# Patient Record
Sex: MALE
[De-identification: names, ages, dates, MRNs, and addresses within clinical notes are randomized per-mention and may not be internally consistent; named-entity substitution may affect disease eponyms.]

---

## 2018-08-27 ENCOUNTER — APPOINTMENT (OUTPATIENT)
Dept: NEUROSURGERY | Facility: CLINIC | Age: 48
End: 2018-08-27
Payer: COMMERCIAL

## 2018-08-27 PROBLEM — Z00.00 ENCOUNTER FOR PREVENTIVE HEALTH EXAMINATION: Status: ACTIVE | Noted: 2018-08-27

## 2018-08-27 PROCEDURE — 99244 OFF/OP CNSLTJ NEW/EST MOD 40: CPT

## 2018-10-05 ENCOUNTER — RECORD ABSTRACTING (OUTPATIENT)
Age: 48
End: 2018-10-05

## 2018-10-05 DIAGNOSIS — Z78.9 OTHER SPECIFIED HEALTH STATUS: ICD-10-CM

## 2018-10-05 RX ORDER — MELOXICAM 15 MG/1
15 TABLET ORAL DAILY
Refills: 0 | Status: ACTIVE | COMMUNITY

## 2018-10-10 ENCOUNTER — APPOINTMENT (OUTPATIENT)
Dept: NEUROSURGERY | Facility: CLINIC | Age: 48
End: 2018-10-10

## 2018-12-12 ENCOUNTER — APPOINTMENT (OUTPATIENT)
Dept: NEUROSURGERY | Facility: CLINIC | Age: 48
End: 2018-12-12
Payer: COMMERCIAL

## 2018-12-12 DIAGNOSIS — R51 HEADACHE: ICD-10-CM

## 2018-12-12 DIAGNOSIS — M79.606 PAIN IN LEG, UNSPECIFIED: ICD-10-CM

## 2018-12-12 PROCEDURE — 99214 OFFICE O/P EST MOD 30 MIN: CPT

## 2018-12-18 PROBLEM — R51 OCCIPITAL PAIN: Status: ACTIVE | Noted: 2018-10-05

## 2018-12-18 PROBLEM — M79.606 LEG PAIN: Status: ACTIVE | Noted: 2018-10-05

## 2019-01-30 ENCOUNTER — APPOINTMENT (OUTPATIENT)
Dept: NEUROSURGERY | Facility: CLINIC | Age: 49
End: 2019-01-30

## 2020-11-16 ENCOUNTER — APPOINTMENT (OUTPATIENT)
Dept: ORTHOPEDIC SURGERY | Facility: CLINIC | Age: 50
End: 2020-11-16
Payer: MEDICARE

## 2020-11-16 VITALS
HEIGHT: 68 IN | OXYGEN SATURATION: 99 % | DIASTOLIC BLOOD PRESSURE: 80 MMHG | BODY MASS INDEX: 25.76 KG/M2 | WEIGHT: 170 LBS | HEART RATE: 78 BPM | SYSTOLIC BLOOD PRESSURE: 126 MMHG

## 2020-11-16 DIAGNOSIS — Z87.891 PERSONAL HISTORY OF NICOTINE DEPENDENCE: ICD-10-CM

## 2020-11-16 PROCEDURE — 99203 OFFICE O/P NEW LOW 30 MIN: CPT

## 2020-11-16 PROCEDURE — 73030 X-RAY EXAM OF SHOULDER: CPT | Mod: LT

## 2020-11-16 RX ORDER — NAPROXEN 500 MG/1
500 TABLET ORAL
Qty: 60 | Refills: 0 | Status: COMPLETED | COMMUNITY
Start: 2020-10-25

## 2020-11-16 RX ORDER — IBUPROFEN 600 MG/1
600 TABLET, FILM COATED ORAL
Qty: 90 | Refills: 0 | Status: COMPLETED | COMMUNITY
Start: 2020-09-01

## 2020-11-16 RX ORDER — LEVOTHYROXINE SODIUM 50 UG/1
50 TABLET ORAL
Qty: 30 | Refills: 0 | Status: COMPLETED | COMMUNITY
Start: 2020-06-13

## 2020-11-16 RX ORDER — FAMOTIDINE 20 MG/1
20 TABLET, FILM COATED ORAL
Qty: 30 | Refills: 0 | Status: COMPLETED | COMMUNITY
Start: 2020-06-24

## 2020-11-16 RX ORDER — OMEPRAZOLE 40 MG/1
40 CAPSULE, DELAYED RELEASE ORAL
Qty: 30 | Refills: 0 | Status: COMPLETED | COMMUNITY
Start: 2020-08-18

## 2020-11-16 RX ORDER — FLUTICASONE PROPIONATE 50 UG/1
50 SPRAY, METERED NASAL
Qty: 16 | Refills: 0 | Status: COMPLETED | COMMUNITY
Start: 2020-10-01

## 2020-11-16 NOTE — ASSESSMENT
[FreeTextEntry1] : 50-year-old gentleman with pain in his LEFT shoulder going on for about 2-1/2 months now. He has some changes in the shoulder on MRI that looked more chronic likely chondral wear seen on the glenoid and subchondral cysts. There is degeneration of the biceps tendon and rotator cuff. There is a small partial tear of the superior fibers of the subscapularis and probably some degeneration or possible tearing of the biceps tendon and the superior labrum.\par some of this may be acute from the fall or he may have just aggravated the underlying changes that were there.\par He will first try a course of physical therapy working on strengthening the shoulder to see if this alleviates the pain.Pain to avoid any bench press or shoulder press or heavy lifting temporarily.\par heat and ice.\par he has meloxicam that he can take.\par If he has ongoing pain and there may be an indication to proceed at some point with surgery which may include a debridement, biceps tenodesis and possible repair of the subscapularis. His a.c. joint which looked inflamed on MRI was nontender today\par Followup in about 5-6 weeks to check on his progress and see if the shoulder is improving.

## 2020-11-16 NOTE — HISTORY OF PRESENT ILLNESS
[de-identified] : Mr. Perez is a 49 yo retired  on disability who comes in complaining of pain in his LEFT shoulder that started around September 1, 2020. He fell going down stairs at home. He had Shoulder pain afterwards which seem to be getting better but things are worse again. He has pain meds about 5/10 constantly and can be sharp at times. It feels better when it's just hanging at his side and hurts more with lifting the arm or sleeping on it. He had gone to a rehabilitation pain management facility. They did a steroid injection on October 16, 2020 which didn't help. He has done some ultrasound and TENS but no physical therapy exercises for the shoulder. He took ibuprofen which hurt his stomach and stopped. Currently he's Taking meloxicam. Pain is in the anterior lateral shoulder.\par No prior shoulder problems or pain.\par He has chronic back pain.

## 2020-11-16 NOTE — PHYSICAL EXAM
[UE] : Sensory: Intact in bilateral upper extremities [Rad] : radial 2+ and symmetric bilaterally [Normal RUE] : Right Upper Extremity: No scars, rashes, lesions, ulcers, skin intact [Normal LUE] : Left Upper Extremity: No scars, rashes, lesions, ulcers, skin intact [Normal Touch] : sensation intact for touch [Normal] : Oriented to person, place, and time, insight and judgement were intact and the affect was normal [de-identified] : Neck and LEFT shoulder\par Cervical spine is without tenderness and ROM is within normal limits and without pain.\par Normal appearance. No edema, ecchymoses, erythema, deformity. Well-developed symmetric musculature.\par AROM: 180 FE, IR to T 9 RIGHT versus L2 on the LEFT , 180 abd. pain at extremes of motion LEFT shoulder\par PROM: 180 FE, 70 ER at the side, 90 ER and 0° RIGHT versus about 20° LEFT IR in the 90 degree abducted position.\par Motor:  5/5  supraspinatus,  5/5 ER, 5/5 IR, 5/5 biceps, 5/5 deltoid.  Mild pain and weakness with lift off test lEFT shoulder\par Left shoulder: + Neer, + Hall. -  X-arm.   + [place]'s, + Speeds. All neg on the Right\par Tender over the biceps tendon and the anterior shoulder.  Nontender a.c. joint\par Laxity: normal\par No scapular winging.\par Sensation is intact distally in the UE.\par Skin is intact in the UE. \par Intact Motor distally.\par  [de-identified] : no respiratory distress or cough [de-identified] : \par X-rays LEFT shoulder AP, Y. lateral and axillary views today show slight irregularity of the glenoid. On the axillary view there is no narrowing of the glenohumeral joint.\par A.c. joint is unremarkable.\par \par \par MRI of the LEFT shoulder was reviewed and showed a tear of the superior and superior anterior labrum with a yst in the bone at the insertion of the biceps tendon which also appears likely degenerative.\par Small partial tear superior fibers of the subscapularis. Tendinosis of the supraspinatus.\par Multiple subchondral cystic changes in the glenoid consistent with degeneration.Small amount of edema in the a.c. joint the distal clavicle.

## 2021-01-04 ENCOUNTER — APPOINTMENT (OUTPATIENT)
Dept: ORTHOPEDIC SURGERY | Facility: CLINIC | Age: 51
End: 2021-01-04

## 2021-01-04 DIAGNOSIS — M94.212 CHONDROMALACIA, LEFT SHOULDER: ICD-10-CM

## 2021-01-04 DIAGNOSIS — M67.814 OTHER SPECIFIED DISORDERS OF TENDON, LEFT SHOULDER: ICD-10-CM

## 2021-01-04 DIAGNOSIS — S43.82XA SPRAIN OF OTHER SPECIFIED PARTS OF LEFT SHOULDER GIRDLE, INITIAL ENCOUNTER: ICD-10-CM

## 2021-01-04 DIAGNOSIS — S43.432A SUPERIOR GLENOID LABRUM LESION OF LEFT SHOULDER, INITIAL ENCOUNTER: ICD-10-CM

## 2021-01-04 NOTE — HISTORY OF PRESENT ILLNESS
[de-identified] : Mr. Perez, a retired  on disability comes in for f/u for pain in his LEFT shoulder that started around September 1, 2020 when he fell going down stairs at home. \par He was seen 7 wks ago \par  He has pain about 5/10 constantly and can be sharp at times.\par He had a steroid injection on October 16, 2020 which didn't help.\par Currently he's Taking meloxicam. Pain is in the anterior lateral shoulder.\par No prior shoulder problems or pain.\par He has chronic back pain.

## 2021-01-04 NOTE — PHYSICAL EXAM
[UE] : Sensory: Intact in bilateral upper extremities [Rad] : radial 2+ and symmetric bilaterally [Normal RUE] : Right Upper Extremity: No scars, rashes, lesions, ulcers, skin intact [Normal LUE] : Left Upper Extremity: No scars, rashes, lesions, ulcers, skin intact [Normal Touch] : sensation intact for touch [Normal] : Oriented to person, place, and time, insight and judgement were intact and the affect was normal [de-identified] : Neck and LEFT shoulder\par Cervical spine is without tenderness and ROM is within normal limits and without pain.\par Normal appearance. No edema, ecchymoses, erythema, deformity. Well-developed symmetric musculature.\par AROM: 180 FE, IR to T 9 RIGHT versus L2 on the LEFT , 180 abd. pain at extremes of motion LEFT shoulder\par PROM: 180 FE, 70 ER at the side, 90 ER and 0° RIGHT versus about 20° LEFT IR in the 90 degree abducted position.\par Motor:  5/5  supraspinatus,  5/5 ER, 5/5 IR, 5/5 biceps, 5/5 deltoid.  Mild pain and weakness with lift off test lEFT shoulder\par Left shoulder: + Neer, + Hall. -  X-arm.   + [place]'s, + Speeds. All neg on the Right\par Tender over the biceps tendon and the anterior shoulder.  Nontender a.c. joint\par Laxity: normal\par No scapular winging.\par Sensation is intact distally in the UE.\par Skin is intact in the UE. \par Intact Motor distally.\par  [de-identified] : no respiratory distress or cough [de-identified] : \par X-rays LEFT shoulder AP, Y. lateral and axillary views today show slight irregularity of the glenoid. On the axillary view there is no narrowing of the glenohumeral joint.\par A.c. joint is unremarkable.\par \par \par MRI of the LEFT shoulder was reviewed and showed a tear of the superior and superior anterior labrum with a cyst in the bone at the insertion of the biceps tendon which also appears likely degenerative.\par Small partial tear superior fibers of the subscapularis. Tendinosis of the supraspinatus.\par Multiple subchondral cystic changes in the glenoid consistent with degeneration.Small amount of edema in the a.c. joint the distal clavicle.

## 2022-02-18 ENCOUNTER — APPOINTMENT (OUTPATIENT)
Dept: CARDIOLOGY | Facility: CLINIC | Age: 52
End: 2022-02-18

## 2022-04-29 ENCOUNTER — APPOINTMENT (OUTPATIENT)
Dept: CARDIOLOGY | Facility: CLINIC | Age: 52
End: 2022-04-29
Payer: MEDICARE

## 2022-04-29 VITALS
TEMPERATURE: 97.9 F | HEIGHT: 68 IN | HEART RATE: 75 BPM | SYSTOLIC BLOOD PRESSURE: 116 MMHG | WEIGHT: 181 LBS | DIASTOLIC BLOOD PRESSURE: 82 MMHG | BODY MASS INDEX: 27.43 KG/M2

## 2022-04-29 PROCEDURE — 93000 ELECTROCARDIOGRAM COMPLETE: CPT

## 2022-04-29 PROCEDURE — 99204 OFFICE O/P NEW MOD 45 MIN: CPT

## 2022-04-29 RX ORDER — ATORVASTATIN CALCIUM 10 MG/1
10 TABLET, FILM COATED ORAL DAILY
Refills: 0 | Status: DISCONTINUED | COMMUNITY
End: 2022-04-29

## 2022-04-29 RX ORDER — ELASTIC BANDAGE 4"
400 BANDAGE TOPICAL DAILY
Refills: 0 | Status: ACTIVE | COMMUNITY
Start: 2022-04-29

## 2022-04-29 RX ORDER — LEVOTHYROXINE SODIUM 0.1 MG/1
100 TABLET ORAL DAILY
Refills: 0 | Status: ACTIVE | COMMUNITY

## 2022-04-29 NOTE — HISTORY OF PRESENT ILLNESS
[FreeTextEntry1] : Pt is 51 year old M with PMH HL \par hasn’t been taking statins that were prescribed 5 y ago, \par Hypothyroidism, PUD, arthritis, bulging disks L4-L5,\par  L shoulder tendonitis   \par Pt is here to establish care.  Pt was never seen by cardiologist before, no cardiac testing in the past \par Pt c/o squeezing  pain in the chest area for few months \par Rather atypical features\par No SOB, no palpitations \par Pt exercises at the gym 1 to 2 times per week

## 2022-04-29 NOTE — ASSESSMENT
[FreeTextEntry1] : 51 year old M \par HL/  Chest pain/  Hypothyroidism/ PUD\par Intermediate risk for cad

## 2022-04-29 NOTE — DISCUSSION/SUMMARY
[FreeTextEntry1] : Chest pain r/o CAD\par \par --  hx of  taking statins \par --  CBC,CMP, CK, Lipid pr, TSH\par --  Exercise Stress Echo, if low risk -med rx, mod to high risk - ccta\par --  Low fat, Low Na diet \par --  Agg risk modification\par --  Activity as tolerated \par --  F/U after testing\par \par

## 2022-04-29 NOTE — PHYSICAL EXAM
[No Acute Distress] : no acute distress [Normal Venous Pressure] : normal venous pressure [No Carotid Bruit] : no carotid bruit [Normal S1, S2] : normal S1, S2 [No Murmur] : no murmur [No Rub] : no rub [No Gallop] : no gallop [Clear Lung Fields] : clear lung fields [Soft] : abdomen soft [Normal Gait] : normal gait [No Edema] : no edema [No Cyanosis] : no cyanosis [No Clubbing] : no clubbing [No Varicosities] : no varicosities [No Rash] : no rash [No Skin Lesions] : no skin lesions [Moves all extremities] : moves all extremities [No Focal Deficits] : no focal deficits [Normal Speech] : normal speech [Alert and Oriented] : alert and oriented [Normal memory] : normal memory

## 2022-05-10 ENCOUNTER — APPOINTMENT (OUTPATIENT)
Dept: CARDIOLOGY | Facility: CLINIC | Age: 52
End: 2022-05-10
Payer: MEDICARE

## 2022-05-10 PROCEDURE — 93351 STRESS TTE COMPLETE: CPT

## 2022-05-10 PROCEDURE — 93320 DOPPLER ECHO COMPLETE: CPT

## 2022-05-10 PROCEDURE — 93325 DOPPLER ECHO COLOR FLOW MAPG: CPT

## 2022-05-11 LAB
ALBUMIN SERPL ELPH-MCNC: 4.8 G/DL
ALP BLD-CCNC: 83 U/L
ALT SERPL-CCNC: 25 U/L
ANION GAP SERPL CALC-SCNC: 13 MMOL/L
AST SERPL-CCNC: 21 U/L
BASOPHILS # BLD AUTO: 0.11 K/UL
BASOPHILS NFR BLD AUTO: 1.4 %
BILIRUB SERPL-MCNC: 0.5 MG/DL
BUN SERPL-MCNC: 16 MG/DL
CALCIUM SERPL-MCNC: 9.8 MG/DL
CHLORIDE SERPL-SCNC: 103 MMOL/L
CHOLEST SERPL-MCNC: 258 MG/DL
CK SERPL-CCNC: 116 U/L
CO2 SERPL-SCNC: 26 MMOL/L
CREAT SERPL-MCNC: 1.05 MG/DL
EGFR: 86 ML/MIN/1.73M2
EOSINOPHIL # BLD AUTO: 0.6 K/UL
EOSINOPHIL NFR BLD AUTO: 7.6 %
GLUCOSE SERPL-MCNC: 92 MG/DL
HCT VFR BLD CALC: 47.6 %
HDLC SERPL-MCNC: 64 MG/DL
HGB BLD-MCNC: 15.1 G/DL
IMM GRANULOCYTES NFR BLD AUTO: 0.6 %
LDLC SERPL CALC-MCNC: 178 MG/DL
LYMPHOCYTES # BLD AUTO: 3.65 K/UL
LYMPHOCYTES NFR BLD AUTO: 46.1 %
MAN DIFF?: NORMAL
MCHC RBC-ENTMCNC: 30.3 PG
MCHC RBC-ENTMCNC: 31.7 GM/DL
MCV RBC AUTO: 95.4 FL
MONOCYTES # BLD AUTO: 0.52 K/UL
MONOCYTES NFR BLD AUTO: 6.6 %
NEUTROPHILS # BLD AUTO: 2.99 K/UL
NEUTROPHILS NFR BLD AUTO: 37.7 %
NONHDLC SERPL-MCNC: 194 MG/DL
PLATELET # BLD AUTO: 328 K/UL
POTASSIUM SERPL-SCNC: 4.8 MMOL/L
PROT SERPL-MCNC: 7.8 G/DL
RBC # BLD: 4.99 M/UL
RBC # FLD: 13 %
SODIUM SERPL-SCNC: 141 MMOL/L
TRIGL SERPL-MCNC: 78 MG/DL
TSH SERPL-ACNC: 4 UIU/ML
WBC # FLD AUTO: 7.92 K/UL

## 2022-06-28 ENCOUNTER — APPOINTMENT (OUTPATIENT)
Dept: CARDIOLOGY | Facility: CLINIC | Age: 52
End: 2022-06-28

## 2022-06-28 VITALS
BODY MASS INDEX: 27.28 KG/M2 | WEIGHT: 180 LBS | TEMPERATURE: 98.3 F | HEART RATE: 68 BPM | HEIGHT: 68 IN | DIASTOLIC BLOOD PRESSURE: 78 MMHG | SYSTOLIC BLOOD PRESSURE: 110 MMHG

## 2022-06-28 DIAGNOSIS — R07.9 CHEST PAIN, UNSPECIFIED: ICD-10-CM

## 2022-06-28 DIAGNOSIS — Z86.39 PERSONAL HISTORY OF OTHER ENDOCRINE, NUTRITIONAL AND METABOLIC DISEASE: ICD-10-CM

## 2022-06-28 DIAGNOSIS — E78.5 HYPERLIPIDEMIA, UNSPECIFIED: ICD-10-CM

## 2022-06-28 DIAGNOSIS — M54.2 CERVICALGIA: ICD-10-CM

## 2022-06-28 PROCEDURE — 99213 OFFICE O/P EST LOW 20 MIN: CPT

## 2022-06-28 PROCEDURE — 93000 ELECTROCARDIOGRAM COMPLETE: CPT

## 2022-06-28 NOTE — ASSESSMENT
[FreeTextEntry1] : 51 year old M \par HL/  Chest pain/  Hypothyroidism/ PUD\par stress echo - low risk at mod workload\par cont with med rx\par lipids reviewed\par dietary modif discussed\par repeat labs \par act as tolerated\par rtc 6 months\par

## 2022-06-28 NOTE — HISTORY OF PRESENT ILLNESS
[FreeTextEntry1] : Pt is 51 year old M with PMH HL, Hypothyroidism, PUD, arthritis, bulging disks L4-L5,\par  L shoulder tendonitis   \par Pt is here  for a f/up visit\par Initially seen for cp, s/p stress echo\par No SOB, no palpitations \par Pt exercises at the gym 1 to 2 times per week

## 2022-12-30 ENCOUNTER — APPOINTMENT (OUTPATIENT)
Dept: CARDIOLOGY | Facility: CLINIC | Age: 52
End: 2022-12-30

## 2023-01-23 ENCOUNTER — APPOINTMENT (OUTPATIENT)
Dept: ORTHOPEDIC SURGERY | Facility: CLINIC | Age: 53
End: 2023-01-23
Payer: MEDICARE

## 2023-01-23 DIAGNOSIS — S43.439A SUPERIOR GLENOID LABRUM LESION OF UNSPECIFIED SHOULDER, INITIAL ENCOUNTER: ICD-10-CM

## 2023-01-23 DIAGNOSIS — M19.019 PRIMARY OSTEOARTHRITIS, UNSPECIFIED SHOULDER: ICD-10-CM

## 2023-01-23 PROCEDURE — 99204 OFFICE O/P NEW MOD 45 MIN: CPT

## 2023-01-23 PROCEDURE — 73030 X-RAY EXAM OF SHOULDER: CPT | Mod: LT

## 2023-01-23 NOTE — HISTORY OF PRESENT ILLNESS
[de-identified] : Patient is here for evaluation of his left shoulder is right-hand-dominant retired  he has had 2  cortisone shots with no relief as well as a series of gel injections into the left shoulders also had therapy, this an acute exacerbation of a chronic problem has been going on for years he denies any allergies denies any hospitalizations does have a history of neck pain lower back pain denies tobacco use and alcohol occasionally and chief complaint is left shoulder pain, reviewing the note from a prior visit from it is a different doctor's office MRI said he had a partial cuff tear as well this is approximately 2018\par \par He has got 120° forward flexion external rotation 80° internal rotation 40° mild pain with cuff resistance impingement and [place]'s and Speed nontender over the AC joint minimal pain with range of motion of his neck\par \par X-rays taken today in the office  left shoulder humeral head spur, no soft tissue calcifications no fractures three views taken today in the office,\par \par  diagnosis arthritis of the left shoulder also with as his primary diagnosis he also has a probable labral tear partial cuff tear synovitis\par \par Talked about use of anti-inflammatories side effects discussed as well as a repeat injections of cortisone in gels as well as arthroscopy, at age 52 but not recommend shoulder replacement also talked about therapy\par Instructions: NSAIDS\par Patient is to begin over the counter oral anti-inflammatory medications on an as needed basis, as long as there are no contra-indications.  Patient is counseled on possible GI and blood pressure side effects.\par \par Home exercises\par  patient was instructed on modalities such as ice and heat, stretching, and strengthening exercises, for home program 2 to 3 times a week\par \par  he does not want any medicines if he changes his mind about the injection  or, arthroscopy  he will call back or make a repeat visit